# Patient Record
Sex: FEMALE | Race: WHITE
[De-identification: names, ages, dates, MRNs, and addresses within clinical notes are randomized per-mention and may not be internally consistent; named-entity substitution may affect disease eponyms.]

---

## 2017-03-08 ENCOUNTER — HOSPITAL ENCOUNTER (EMERGENCY)
Dept: HOSPITAL 10 - FTE | Age: 72
Discharge: HOME | End: 2017-03-08
Payer: COMMERCIAL

## 2017-03-08 VITALS — WEIGHT: 137.79 LBS | HEIGHT: 55 IN | BODY MASS INDEX: 31.89 KG/M2

## 2017-03-08 VITALS — DIASTOLIC BLOOD PRESSURE: 79 MMHG | SYSTOLIC BLOOD PRESSURE: 186 MMHG | HEART RATE: 68 BPM | RESPIRATION RATE: 18 BRPM

## 2017-03-08 DIAGNOSIS — R51: Primary | ICD-10-CM

## 2017-03-08 DIAGNOSIS — E11.9: ICD-10-CM

## 2017-03-08 DIAGNOSIS — I10: ICD-10-CM

## 2017-03-08 DIAGNOSIS — Z79.4: ICD-10-CM

## 2017-03-08 DIAGNOSIS — Z79.84: ICD-10-CM

## 2017-03-08 PROCEDURE — 81003 URINALYSIS AUTO W/O SCOPE: CPT

## 2017-03-08 PROCEDURE — 70450 CT HEAD/BRAIN W/O DYE: CPT

## 2017-03-08 NOTE — RADRPT
PROCEDURE:   CT Brain without. 

 

CLINICAL INDICATION:  Headache.  Hypertension.

 

TECHNIQUE:   A CT of the brain was performed on a multi-slice CT scanner utilizing axial sections fr
om the skull base through the vertex without contrast.   Coronal and sagittal reconstructed images w
ere provided.  One or more of the following does reduction techniques were used:  Automated exposure
 control; adjustment of the mA and/or kV according to patient size; use of the aorta of reconstructi
on technique. Images were reviewed on a high-resolution PACS workstation.  The exam CTDI = 38.54 mGy
.  The exam DLP = 554.95 mGy-cm.

 

COMPARISON:   None available  

 

FINDINGS:

 

There is mild age appropriate global volume loss.  The ventricles and sulci are symmetric in size an
d morphology.  There is no evidence of intracranial hemorrhage, mass effect, edema or midline shift.
  No abnormal intra-axial or extra-axial fluid collections are seen.  The gray/white matter differen
tiation is well preserved.  

There is dense opacification of the partially visualized right maxillary sinus and moderate disease 
of the right ethmoidal sinus.  The remainder of the paranasal sinuses are clear.  The mastoid air ce
lls are clear. The surrounding soft tissue scalp and bony calvarium are intact and normal. Atheroscl
erotic vascular calcifications are present 

 

IMPRESSION:

 

1.  No CT evidence of acute intracranial pathology.

2.  Right maxillary and ethmoidal sinus disease.

 

RPTAT: KK

_____________________________________________ 

.Edis Sen MD, MD           Date    Time 

Electronically viewed and signed by .Edis Sen MD, MD on 03/08/2017 12:22 

 

D:  03/08/2017 12:22  T:  03/08/2017 12:22

.B/

## 2017-03-08 NOTE — ERD
ER Documentation


Chief Complaint


Date/Time


DATE: 3/8/17 


TIME: 11:39


Chief Complaint


headache with no neuro def. no blurry vision no n/v. no recent trauma





HPI


This a 71-year-old female who presents the emergency department today 

complaining of a headache for the past week.  Patient states the headache is on 

her right side.  Patient states that she has some tearing in her right eye.  

She has a history of diabetes and high blood pressure and high cholesterol.  

States she takes Tylenol but it only works for about 3 hours and then the 

symptoms returned.  Denies any blurred vision, nausea vomiting.  Denies any 

trauma.





ROS


All systems reviewed and are negative except as per history of present illness.





Medications


Home Meds


Active Scripts


Naproxen* (Naprosyn*) 500 Mg Tablet, 500 MG PO BID Y for PAIN AND/OR 

INFLAMMATION, #30 TAB


   Prov:TRESA KIMBLE PA-C         3/8/17


Hydrocodone/Acetaminophen (Norco 5-325 Tablet) 1 Each Tablet, 1 TAB PO Q6H Y 

for PAIN, #12 TAB


   Prov:TRESA KIMBLE PA-C         3/8/17


Zolpidem Tartrate (Ambien Kishan) 5 Mg Tab, 5 MG PO HS Y for INSOMNIA for 30 Days, 

TAB


   Prov:ROSIE SLULIVAN         12/6/15


Hydrochlorothiazide* (Hydrochlorothiazide*) 12.5 Mg Tablet, 25 MG PO DAILY for 

30 Days, TAB 1 Refill


   Prov:ROSIE SULLIVAN         12/6/15


Reported Medications


Insulin Glargine* (Lantus*) 100 Unit/Ml Soln, 34 UNIT SC HS, EA


   12/4/15


Sitagliptin* (Januvia*) 100 Mg Tablet, 100 MG PO DAILY, TAB


   12/4/15


Aspirin* (Aspirin* EC) 81 Mg Tablet.dr, 81 MG PO DAILY, TAB


   12/4/15


Glipizide* (Glipizide*) 10 Mg Tablet, 10 MG PO BID, TAB


   12/4/15


Losartan Potassium* (Losartan Potassium*) 100 Mg Tablet, 100 MG PO DAILY, TAB


   12/4/15


Albuterol Sulfate* (Proair HFA*) 8.5 Gm Hfa.aer.ad, 2 PUFF INH Q4H Y for 

WHEEZING AND SOB, INH


   12/4/15


Dexlansoprazole (Dexilant) 30 Mg Cap.mp, 30 MG PO DAILY, CAP


   12/4/15





Allergies


Allergies:  


Coded Allergies:  


     No Known Allergy (Unverified , 12/4/15)





PMhx/Soc


History of Surgery:  Yes (hysterectomy)


Anesthesia Reaction:  No


Hx Neurological Disorder:  No


Hx Respiratory Disorders:  No


Hx Cardiac Disorders:  Yes (HTN)


Hx Psychiatric Problems:  No


Hx Miscellaneous Medical Probl:  No


Hx Alcohol Use:  No


Hx Substance Use:  No


Hx Tobacco Use:  No





Physical Exam


Vitals





Vital Signs








  Date Time  Temp Pulse Resp B/P Pulse Ox O2 Delivery O2 Flow Rate FiO2


 


3/8/17 08:39 98.8 80 21 177/87 98   








Physical Exam


Const:    No acute distress


Head:   Atraumatic 


Eyes:    Normal Conjunctiva.  PERRLA.  EOM intact


ENT:    Normal External Ears, Nose and Mouth.


Neck:               Full range of motion..~ No meningismus.


Resp:    Clear to auscultation bilaterally


Cardio:    Regular rate and rhythm, no murmurs


Skin:    No petechiae or rashes


Back:    No midline or flank tenderness


Ext:    No cyanosis, or edema


Neur:    Awake and alert.  No focal neurologic deficits.  No gait ataxia.


Psych:    Normal Mood and Affect


Results 24 hrs





 Laboratory Tests








Test


  3/8/17


11:36


 


Bedside Urine Blood Negative 


 


Bedside Urine Glucose (UA) 0.50% 


 


Bedside Urine Ketones (LAB) Negative 


 


Bedside Urine Leukocyte


Esterase (L Negative 


 


 


Bedside Urine Nitrite (LAB) Negative 


 


Bedside Urine Protein (LAB) 1+ 


 


Bedside Urine pH (LAB) 5.5 








 Current Medications








 Medications


  (Trade)  Dose


 Ordered  Sig/Shaista


 Route


 PRN Reason  Start Time


 Stop Time Status Last Admin


Dose Admin


 


 Acetaminophen/


 Hydrocodone Bitart


  (Norco (5/325))  1 tab  ONCE  ONCE


 PO


   3/8/17 11:30


 3/8/17 11:31 DC 3/8/17 11:28


 








 DIAGNOSTIC IMAGING REPORT





 Patient: AZIZA JACOBS   : 1945   Age: 71  Sex: F                   

     


 MR #:    [id number]   Acct #:   [id number]    DOS: 17 0000


 Ordering MD: TRESA KIMBLE PA-C   Location:  E   Room/Bed:             

                               


 








PROCEDURE:   CT Brain without. 


 


CLINICAL INDICATION:  Headache.  Hypertension.


 


TECHNIQUE:   A CT of the brain was performed on a multi-slice CT scanner 

utilizing axial sections from the skull base through the vertex without 

contrast.   Coronal and sagittal reconstructed images were provided.  One or 

more of the following does reduction techniques were used:  Automated exposure 

control; adjustment of the mA and/or kV according to patient size; use of the 

aorta of reconstruction technique. Images were reviewed on a high-resolution 

PACS workstation.  The exam CTDI = 38.54 mGy.  The exam DLP = 554.95 mGy-cm.


 


COMPARISON:   None available  


 


FINDINGS:


 


There is mild age appropriate global volume loss.  The ventricles and sulci are 

symmetric in size and morphology.  There is no evidence of intracranial 

hemorrhage, mass effect, edema or midline shift.  No abnormal intra-axial or 

extra-axial fluid collections are seen.  The gray/white matter differentiation 

is well preserved.  


There is dense opacification of the partially visualized right maxillary sinus 

and moderate disease of the right ethmoidal sinus.  The remainder of the 

paranasal sinuses are clear.  The mastoid air cells are clear. The surrounding 

soft tissue scalp and bony calvarium are intact and normal. Atherosclerotic 

vascular calcifications are present 


 


IMPRESSION:


 


1.  No CT evidence of acute intracranial pathology.


2.  Right maxillary and ethmoidal sinus disease.


 


RPTAT: KK


_____________________________________________ 


.Edis Sen MD, MD           Date    Time 


Electronically viewed and signed by .Edis Sen MD, MD on 2017 12:22 


 


D:  2017 12:22  T:  2017 12:22


.B/





CC: TRESA KIMBLE PA-C





Procedures/[facility]


This is a 71-year-old female who presents to the emergency department today 

complaining of headache for the past week.  Patient indicated a headache is on 

her right side.  Patient has a history of high blood pressure.  Her blood 

pressure intake was 177/88.  I have explained to the patient that I feel CT 

scan is appropriate at this time given her complaints, age and history of 

hypertension.  I also obtained a UA.





Head CT shows no CT evidence of acute intracranial pathology.  There is no 

intracranial hemorrhage, mass-effect, edema or midline shift.  Low suspicion 

for acute hemorrhage, mass, abscess


UA is negative for infection.  Urine shows glucose.  No ketones.  Patient is 

known diabetic.





Patient was given Norco here in the emergency department and symptoms 

significantly improved.  Patient was sitting up in the waiting room eating a 

sandwich. 


Patient symptoms at this time is consistent with headache.  I have explained to 

the patient that her blood pressure is elevated and she needs to follow-up with 

her primary care physician for further evaluation and management.  Low 

suspicion for hypertensive emergency.  Patient was given a prescription for 

Norco and Naprosyn for home





At this time the patient is stable for discharge and outpatient management. 

Patient should follow up with their PCP in the next 1-2 days.  They may return 

to the emergency department sooner for any persistent or worsening of symptoms.

  Patient and daughter understood and agreed with the plan.





I discussed the patient with Dr. Thrasher and he is in agreement with the plan.





Departure


Diagnosis:  


 Primary Impression:  


 Headache


 Headache type:  unspecified  Headache chronicity pattern:  unspecified pattern

  Intractability:  not intractable  Qualified Code:  R51 - Nonintractable 

headache, unspecified chronicity pattern, unspecified headache type


Condition:  Fair











TRESA KIMBLE PA-C Mar 8, 2017 11:41

## 2019-06-10 ENCOUNTER — HOSPITAL ENCOUNTER (EMERGENCY)
Dept: HOSPITAL 10 - E/R | Age: 74
Discharge: HOME | End: 2019-06-10
Payer: COMMERCIAL

## 2019-06-10 ENCOUNTER — HOSPITAL ENCOUNTER (EMERGENCY)
Dept: HOSPITAL 91 - E/R | Age: 74
Discharge: HOME | End: 2019-06-10
Payer: COMMERCIAL

## 2019-06-10 VITALS
HEIGHT: 64 IN | BODY MASS INDEX: 23.18 KG/M2 | BODY MASS INDEX: 23.18 KG/M2 | WEIGHT: 135.8 LBS | WEIGHT: 135.8 LBS | HEIGHT: 64 IN

## 2019-06-10 VITALS — HEART RATE: 89 BPM | DIASTOLIC BLOOD PRESSURE: 89 MMHG | RESPIRATION RATE: 16 BRPM | SYSTOLIC BLOOD PRESSURE: 177 MMHG

## 2019-06-10 DIAGNOSIS — I10: ICD-10-CM

## 2019-06-10 DIAGNOSIS — Z79.4: ICD-10-CM

## 2019-06-10 DIAGNOSIS — R51: Primary | ICD-10-CM

## 2019-06-10 DIAGNOSIS — Z79.82: ICD-10-CM

## 2019-06-10 DIAGNOSIS — E11.9: ICD-10-CM

## 2019-06-10 PROCEDURE — 70450 CT HEAD/BRAIN W/O DYE: CPT

## 2019-06-10 PROCEDURE — 99284 EMERGENCY DEPT VISIT MOD MDM: CPT

## 2019-06-10 RX ADMIN — ONDANSETRON 1 MG: 4 TABLET, ORALLY DISINTEGRATING ORAL at 09:32

## 2019-06-10 RX ADMIN — HYDROCODONE BITARTRATE AND ACETAMINOPHEN 1 TAB: 10; 325 TABLET ORAL at 09:32

## 2019-06-10 NOTE — ERD
ER Documentation


Chief Complaint


Chief Complaint





Right sided facial pain with numbness on right side of face x6 days





HPI


Patient is a 73-year-old female with hypertension, diabetes, and high 


cholesterol who presents with headache.  Please note that a  


was used for the entire history and physical exam.  The patient said that she 


has 1 week of headache and pain in the right eye and right neck.  The patient 


has a pulsating type pain which comes and goes.  She says "I feel like my ears 


are clogged".  She went to see a doctor at a local clinic who said that maybe 


she has a stroke or ear infection and she was started on acyclovir and 


amoxicillin yesterday.  Upon review of old medical records this is the patient's


third visit to the ER since 2008.  She does have a primary doctor.





ROS


All systems reviewed and are negative except as per history of present illness.





Medications


Home Meds


Active Scripts


Ibuprofen* (Motrin*) 600 Mg Tab, 600 MG PO Q6H PRN for PAIN AND OR ELEVATED 


TEMP, #30 TAB


   Prov:ZORAN SMITH MD         6/10/19


Reported Medications


Acyclovir* (Acyclovir*) 400 Mg Tablet, 400 MG PO BID, TAB


   6/10/19


Amoxicillin* (Amoxicillin*) 500 Mg Cap, 500 MG PO BID, #20 CAP


   6/10/19


Liraglutide (Victoza 2-Kishan) 0.6 Mg/0.1 Ml Pen.injctr, 1.2 MG SQ DAILY, SYR


   6/10/19


Insulin Glargine,Hum.rec.anlog (Basaglar Kwikpen U-100) 100 Unit/1 Ml 


Insuln.pen, 32 UNIT SC QAM, EA


   6/10/19


Simvastatin* (Zocor*) 20 Mg Tablet, 20 MG PO QHS, #30 TAB


   6/10/19


Pantoprazole* (Pantoprazole*) 40 Mg Tablet.dr, 40 MG PO AC BREAKFAST, TAB


   6/10/19


Aspirin* (Aspirin* EC) 81 Mg Tablet.dr, 81 MG PO DAILY, TAB


   6/10/19


Metoprolol Succinate* (Toprol XL*) 100 Mg Tab.sr.24h, 100 MG PO DAILY, #30 TAB


   6/10/19


Hydralazine Hcl* (Hydralazine Hcl*) 25 Mg Tab, 25 MG PO Q6H, #60 TAB


   6/10/19


Discontinued Reported Medications


Insulin Glargine* (Lantus*) 100 Unit/Ml Soln, 34 UNIT SC HS, EA


   12/4/15


Sitagliptin* (Januvia*) 100 Mg Tablet, 100 MG PO DAILY, TAB


   12/4/15


Aspirin* (Aspirin* EC) 81 Mg Tablet.dr, 81 MG PO DAILY, TAB


   12/4/15


Glipizide* (Glipizide*) 10 Mg Tablet, 10 MG PO BID, TAB


   12/4/15


Losartan Potassium* (Losartan Potassium*) 100 Mg Tablet, 100 MG PO DAILY, TAB


   12/4/15


Albuterol Sulfate* (Proair HFA*) 8.5 Gm Hfa.aer.ad, 2 PUFF INH Q4H PRN for 


WHEEZING AND SOB, INH


   12/4/15


Dexlansoprazole (Dexilant) 30 Mg Cap.mp, 30 MG PO DAILY, CAP


   12/4/15


Discontinued Scripts


Naproxen* (Naprosyn*) 500 Mg Tablet, 500 MG PO BID PRN for PAIN AND/OR 


INFLAMMATION, #30 TAB


   Prov:TRESA KIMBLE PA-C         3/8/17


Hydrocodone/Acetaminophen (Norco 5-325 Tablet) 1 Each Tablet, 1 TAB PO Q6H PRN 


for PAIN, #12 TAB


   Prov:TRESA KIMBLE PA-C         3/8/17


Zolpidem Tartrate (Ambien Kishan) 5 Mg Tab, 5 MG PO HS PRN for INSOMNIA for 30 


Days, TAB


   Prov:ROSIE SULLIVAN         12/6/15


Hydrochlorothiazide* (Hydrochlorothiazide*) 12.5 Mg Tablet, 25 MG PO DAILY for 


30 Days, TAB 1 Refill


   Prov:ROSIE SULLIVAN         12/6/15





Allergies


Allergies:  


Coded Allergies:  


     No Known Allergy (Unverified , 6/10/19)





PMhx/Soc


History of Surgery:  Yes (hysterectomy)


Anesthesia Reaction:  No


Hx Neurological Disorder:  No


Hx Respiratory Disorders:  No


Hx Cardiac Disorders:  Yes (HTN, HIGH CHOLESTEROL)


Hx Psychiatric Problems:  No


Hx Miscellaneous Medical Probl:  No


Hx Alcohol Use:  No


Hx Substance Use:  No


Hx Tobacco Use:  No


Smoking Status:  Never smoker





FmHx


Family History:  diabetes





Physical Exam


Vitals





Vital Signs


  Date      Temp  Pulse  Resp  B/P (MAP)   Pulse Ox  O2          O2 Flow    FiO2


Time                                                 Delivery    Rate


   6/10/19  98.8     94    18      187/86        97


     08:48                          (119)





Physical Exam


Const:   No acute distress


Head:   Atraumatic 


Eyes:    Normal Conjunctiva


ENT:    Normal External Ears, Nose and Mouth.


Neck:               Full range of motion. No meningismus.


Resp:   Clear to auscultation bilaterally


Cardio:   Regular rate and rhythm, no murmurs


Abd:    Soft, non tender, non distended. Normal bowel sounds


Skin:   No petechiae or rashes


Back:   No midline or flank tenderness


Ext:    No cyanosis, or edema


Neur:   Awake and alert, cranial nerves II through XII intact, strength is 5 out


of 5 in all 4 extremities, no slurred speech


Psych:    Normal Mood and Affect


Results 24 hrs





Current Medications


 Medications
   Dose
          Sig/Shaista
       Start Time
   Status  Last


 (Trade)       Ordered        Route
 PRN     Stop Time              Admin
Dose


                              Reason                                Admin


                1 tab          ONCE  ONCE
    6/10/19       DC           6/10/19


Acetaminophen                 PO
            09:30
                       09:32



/
                                           6/10/19 09:31


Hydrocodone


Bitart



(Norco


(10/325))


 Ondansetron    4 mg           ONCE  STAT
    6/10/19       DC           6/10/19


HCl
  (Zofran                 ODT
           09:17
                       09:32



Odt)                                         6/10/19 09:19








Procedures/MDM


CT brain negative per radiology.





Patient is a 73-year-old female who presents with headache.  Her headache was 


gradual in onset I doubt intracranial hemorrhage, stroke, or mass.  She was 


given Norco and Zofran.  The patient will be discharged home to follow-up 


closely with her primary doctor within 24 to 48 hours.  She can return for any 


worsening symptoms.





Departure


Diagnosis:  


   Primary Impression:  


   Headache


   Headache type:  unspecified  Headache chronicity pattern:  acute headache  


   Intractability:  not intractable  Qualified Codes:  R51 - Headache


Condition:  Fair


Patient Instructions:  Self-Care for Headaches


Referrals:  


Dr. Tunrer





Additional Instructions:  


Llame al doctor MAANA y ana cuong MIGUEL PARA DENTRO DE 1-2 CASILLAS.Dgale a la 


secretaria que nosotros le instruimos hacer esta miguel.Avise o llame si wetzel co


ndicin se empeora antes de la miguel. Regresa aqui si peor o no mejor.











ZORAN SMITH MD                Silas 10, 2019 10:21

## 2019-07-03 ENCOUNTER — HOSPITAL ENCOUNTER (EMERGENCY)
Dept: HOSPITAL 91 - FTE | Age: 74
LOS: 1 days | Discharge: HOME | End: 2019-07-04
Payer: COMMERCIAL

## 2019-07-03 ENCOUNTER — HOSPITAL ENCOUNTER (EMERGENCY)
Dept: HOSPITAL 10 - FTE | Age: 74
LOS: 1 days | Discharge: HOME | End: 2019-07-04
Payer: COMMERCIAL

## 2019-07-03 VITALS — WEIGHT: 127.43 LBS | BODY MASS INDEX: 29.49 KG/M2 | HEIGHT: 55 IN

## 2019-07-03 VITALS — RESPIRATION RATE: 15 BRPM | SYSTOLIC BLOOD PRESSURE: 137 MMHG | HEART RATE: 77 BPM | DIASTOLIC BLOOD PRESSURE: 72 MMHG

## 2019-07-03 DIAGNOSIS — R51: Primary | ICD-10-CM

## 2019-07-03 DIAGNOSIS — I12.9: ICD-10-CM

## 2019-07-03 DIAGNOSIS — N18.9: ICD-10-CM

## 2019-07-03 DIAGNOSIS — Z79.4: ICD-10-CM

## 2019-07-03 DIAGNOSIS — Z79.82: ICD-10-CM

## 2019-07-03 LAB
ANION GAP: 9 (ref 5–13)
BLOOD UREA NITROGEN: 26 MG/DL (ref 7–20)
CALCIUM: 9.3 MG/DL (ref 8.4–10.2)
CARBON DIOXIDE: 24 MMOL/L (ref 21–31)
CHLORIDE: 106 MMOL/L (ref 97–110)
CREATININE: 1.39 MG/DL (ref 0.44–1)
GLUCOSE: 144 MG/DL (ref 70–220)
POTASSIUM: 5.3 MMOL/L (ref 3.5–5.1)
SODIUM: 139 MMOL/L (ref 135–144)

## 2019-07-03 PROCEDURE — 80048 BASIC METABOLIC PNL TOTAL CA: CPT

## 2019-07-03 PROCEDURE — 70553 MRI BRAIN STEM W/O & W/DYE: CPT

## 2019-07-03 PROCEDURE — 99285 EMERGENCY DEPT VISIT HI MDM: CPT

## 2019-07-03 RX ADMIN — ACETAMINOPHEN 1 MG: 500 TABLET, FILM COATED ORAL at 22:14

## 2019-07-03 RX ADMIN — THIAMINE HYDROCHLORIDE 1 MLS/HR: 100 INJECTION, SOLUTION INTRAMUSCULAR; INTRAVENOUS at 22:14

## 2019-07-03 NOTE — ERD
ER Documentation


Chief Complaint


Chief Complaint





SENT BY MD FOR CT, CC HA WITH EYES CROSSING X1 MO





HPI


Patient is a 73-year-old female who is presenting to the ED after going to her 


doctor today, the providers requesting we do a brain MRI to rule out mass 


lesion.  Patient recently had a history of shingles in the right side of her 


scalp and that is in the region of where she is having her headache..  Patient 


daughter is present and states the patient has been experiencing headaches for 


the last month with blurry vision and  confusion.  Patient has a history of 


hypertension, CKD, hysterectomy.  Patient denies any allergies to medication and


only is taking medications for high blood pressure and cholesterol.  Patient is 


able to speak in full sentences is presenting alert oriented x4 with no slurred 


speech.  Patient was advised that the brain MRI cannot be done until 11 PM 


tonight.  The patient and daughter are both okay with this and plan to wait 


around.





ROS


All systems reviewed and are negative except as per history of present illness.





Medications


Home Meds


Active Scripts


Naloxone HCl nasal spray (Narcan 4 mg/0.1 mL nasal) 4 Mg Spray, 4 MG NS .Q2-3MIN


for OPIOID OVERDOSE, #2 SPRAY 0 Refills


   Spray 0.1 mL into one nostril.  Repeat with second device into other


   nostril after 2-3 minutes if no or minimal response


   Prov:JULIAN IVY PA-C         7/3/19


Acetaminophen with Codeine (Acetaminophen-Cod #3 Tablet) 1 Each Tablet, 1 TAB PO


Q6H PRN for PAIN, #7 TAB


   Prov:JULIAN IVY PA-C         7/3/19


Ibuprofen* (Motrin*) 600 Mg Tab, 600 MG PO Q6H PRN for PAIN AND OR ELEVATED 


TEMP, #30 TAB


   Prov:ZORAN SMITH MD         6/10/19


Reported Medications


Acyclovir* (Acyclovir*) 400 Mg Tablet, 400 MG PO BID, TAB


   START TAKING 6/9/19


   6/10/19


Amoxicillin* (Amoxicillin*) 500 Mg Cap, 500 MG PO BID, #20 CAP


   START TAKING 6/9/19


   6/10/19


Liraglutide (Victoza 2-Kishan) 0.6 Mg/0.1 Ml Pen.injctr, 1.2 MG SQ DAILY, SYR


   6/10/19


Insulin Glargine,Hum.rec.anlog (Basaglar Kwikpen U-100) 100 Unit/1 Ml 


Insuln.pen, 32 UNIT SC QAM, EA


   6/10/19


Simvastatin* (Zocor*) 20 Mg Tablet, 20 MG PO QHS, #30 TAB


   6/10/19


Pantoprazole* (Pantoprazole*) 40 Mg Tablet.dr, 40 MG PO AC BREAKFAST, TAB


   6/10/19


Aspirin* (Aspirin* EC) 81 Mg Tablet.dr, 81 MG PO DAILY, TAB


   6/10/19


Metoprolol Succinate* (Toprol XL*) 100 Mg Tab.sr.24h, 100 MG PO DAILY, #30 TAB


   6/10/19


Hydralazine Hcl* (Hydralazine Hcl*) 25 Mg Tab, 25 MG PO Q6H, #60 TAB


   6/10/19





Allergies


Allergies:  


Coded Allergies:  


     No Known Allergy (Unverified , 6/10/19)





PMhx/Soc


History of Surgery:  Yes (hysterectomy)


Anesthesia Reaction:  No


Hx Neurological Disorder:  No


Hx Respiratory Disorders:  No


Hx Cardiac Disorders:  Yes (HTN, HIGH CHOLESTEROL)


Hx Psychiatric Problems:  No


Hx Miscellaneous Medical Probl:  No


Hx Alcohol Use:  No


Hx Substance Use:  No


Hx Tobacco Use:  No


Smoking Status:  Never smoker





FmHx


Family History:  No diabetes, No coronary disease, No other





Physical Exam


Vitals





Vital Signs


  Date      Temp  Pulse  Resp  B/P (MAP)   Pulse Ox  O2          O2 Flow    FiO2


Time                                                 Delivery    Rate


    7/3/19  98.6     74    72      141/72        98  Room Air


     21:19                           (95)


    7/3/19  98.4     74    20      155/74        99


     16:30                          (101)





Physical Exam


Const:   No acute distress


Head:   Atraumatic 


Eyes:    Normal Conjunctiva


ENT:    Normal External Ears, Nose and Mouth.


Neck:               Full range of motion. No meningismus.


Resp:   Clear to auscultation bilaterally


Cardio:   Regular rate and rhythm, no murmurs


Abd:    Soft, non tender, non distended. Normal bowel sounds


Skin:   No petechiae or rashes


Back:   No midline or flank tenderness


Ext:    No cyanosis, or edema


Neur:   Awake and alert


Psych:    Normal Mood and Affect


Result Diagram:  


7/3/19 2240





Results 24 hrs





Laboratory Tests


                Test
                               7/3/19
22:40


                Sodium Level                         139 mmol/L


                Potassium Level                      5.3 mmol/L


                Chloride Level                       106 mmol/L


                Carbon Dioxide Level                  24 mmol/L


                Anion Gap                                     9


                Blood Urea Nitrogen                    26 mg/dl


                Creatinine                           1.39 mg/dl


                Est Glomerular Filtrat Rate
mL/min   mL/min 



                Glucose Level                         144 mg/dl


                Calcium Level                         9.3 mg/dl





Current Medications


 Medications
   Dose
          Sig/Shaista
       Start Time
   Status  Last


 (Trade)       Ordered        Route
 PRN     Stop Time              Admin
Dose


                              Reason                                Admin


                500 mg         ONCE  STAT
    7/3/19        DC            7/3/19


Acetaminophen                 PO
            21:41
 7/3/19                22:14




  (Tylenol                                  21:42


Tab)


 Sodium         1,000 ml @ 
   Q1H ONCE
      7/3/19        DC            7/3/19


Chloride       1,000 mls/hr   IV
            22:00
 7/3/19                22:14



                                             22:59








Procedures/MDM


ED course:


MRI brain, normal saline, acetaminophen


The patient was stable throughout the ED course.  The patient and/or family 


informed of laboratory and diagnostic imaging results throughout the ED course.





.


Diagnostic imaging:


Read by radiologist Hood Jakcson MD,


PROCEDURE:   MRI Brain with and without contrast. 


 


CLINICAL INDICATION:   Worsening neurologic symptoms over the last 2 weeks, 


left-sided pain/visual changes. 


 


TECHNIQUE:   An MRI of the brain was performed with and without contrast 


utilizing the following sequences:  Sagittal T1 weighted, sagittal FLAIR, axial 


T1, axial FLAIR, axial T2 weighted, axial diffusion weighted (EPI technique 


f=[id number]), axial ADC mapping, and post contrast axial and coronal T1 weighted, and


axial FLAIR. 10 cc of ProHance was given intravenously without complication.  


The images were reviewed on a high-resolution PACS workstation. DICOM Images are


available. 


 


COMPARISON:   CT head 10/20/2019, 03/08/2017 


 


FINDINGS:


Diffusion weighted sequences demonstrate no evidence of acute lacunar or lobar 


infarction.  There is no intracranial hemorrhage, extra-axial fluid collection, 


mass lesion, midline shift or hydrocephalous.  There is mild prominence of the 


cerebral sulci, lateral and third ventricles. There is mild punctate 


periventricular and subcortical T2 / FLAIR signal hyperintensities. The basal 


cisterns are patent.  Normal flow voids are visible the proximal intracranial ar


teries and dural sinuses, indicating patency.  The midline structures are 


intact. The postcontrast images show no abnormal parenchymal, leptomeningeal, or


dural enhancement. The susceptibility weighted images are normal in appearance.


 


The paranasal sinuses, mastoid air cells and middle ear cavities are normally 


aerated.  The orbits, calvarium and extracranial soft tissues are normal in 


appearance.


 


IMPRESSION:


 


1.  No acute intracranial abnormality.  No intracranial hemorrhage, enhancing 


mass lesion, infarction or hydrocephalous.  


2.  Mild peripheral and central cerebral volume loss.


3.  Mild punctate periventricular and subcortical white matter lesions, likely 


related to chronic microangiopathic changes. 


 


Procedures:


None





Medications given in ER:


Acetaminophen





Patient tolerated medication well with no adverse reactions.  Patient reported 


improvement in pain.





Medical decision making:


Patient is a 73-year-old female she is being sent over from her primary care 


divider's office.  Patient has been having new onset neurological symptoms that 


been off and on for the last few weeks.  Patient recently had shingles on her 


scalp and her doctor is concerned for a mass or lesion in the brain.  Patient 


had recently had a CT scan that came back completely normal.  Orders are for a M


RI of the brain.  Patient's physical exam was unremarkable, no neuro deficits 


noted.  Patient is alert oriented x4.  Patient states that she does have a mild 


headache.  Patient denies any nausea vomiting diarrhea.  The patient's daughter 


is in the room with me and she states that the patient has been going in and out


of confusion over the last few weeks and that this is a new symptom for her she 


has no history of Alzheimer or dementia.  Patient was sent for MRI of the brain 


which showed normal deterioration with age.  The findings were discussed with 


the family.  The patient was given a liter of fluid post imaging because she has


a history of mild CKD.  On reevaluation the patient appears to be doing much 


better there is no signs of fluid retention or adverse reaction to the contrast.


 The patient's BUN/creatinine was compared to a previous level that was pulled 


over a month ago and the levels are consistent.  Patient was given acetaminophen


in the ER and on reevaluation states the headache is a little bit better but she


tends to take acetaminophen with codeine and that has helped with her headaches 


in the past.  She has a prescription with her that she is almost out of.  I 


advised the family that I will refill the prescription.  At this time I have low


suspicion for  CVA, TIA, intracranial hemorrhage, meningitis, encephalitis, CO 


poisoning, temporal arteritis, benign intracranial hypertension, intracranial 


mass, glaucoma, preeclampsia, sinusitis, tension headache, migraine headache, 


cluster headache, acute kidney failure.  Patient was advised that she needs to 


follow-up with her primary care provider regarding this visit and discuss the 


results.  Patient was given a copy of the MRI results.  Patient is also being 


given referrals for neurologist.  The patient had no further questions upon 


discharge the family had no further questions upon discharge.  They were advised


that if her symptoms worsen to return to the ER immediately.  The family and 


patient are all in agreement to the treatment plan and had no further questions 


upon discharge








Prescription for home:


Acetaminophen with codeine








Discharge:


At this time, patient is stable for discharge and outpatient management.  I have


instructed the patient to follow-up with his\her primary care physician in 1 to 


2 days.  I have discussed with the patient the possibility of needing to see a 


specialist for further work-up and imaging studies if symptoms persist.  I have 


instructed the patient to promptly return to the ER for any new or worsening 


symptoms including increased pain, fever, nausea, vomiting, weakness or LOC.  


The patient and\or family expressed understanding of and agreement with this 


plan.  All questions were answered.  Home care instructions were provided.








Disclaimer:


Inadvertent spelling and grammatical errors are likely due to EHR\dictation 


software use and do not reflect on the overall quality of patient care.  Also, 


please note that the electronic time recorded on the note does not necessarily 


reflect the actual time of the patient encounter.











JULIAN IVY PA-C                Jul 3, 2019 17:42